# Patient Record
Sex: MALE | URBAN - METROPOLITAN AREA
[De-identification: names, ages, dates, MRNs, and addresses within clinical notes are randomized per-mention and may not be internally consistent; named-entity substitution may affect disease eponyms.]

---

## 2024-09-27 ENCOUNTER — LAB OUTSIDE AN ENCOUNTER (OUTPATIENT)
Dept: URBAN - METROPOLITAN AREA TELEHEALTH 2 | Facility: TELEHEALTH | Age: 39
End: 2024-09-27

## 2024-09-27 ENCOUNTER — DASHBOARD ENCOUNTERS (OUTPATIENT)
Age: 39
End: 2024-09-27

## 2024-09-27 ENCOUNTER — WEB ENCOUNTER (OUTPATIENT)
Dept: URBAN - METROPOLITAN AREA CLINIC 6 | Facility: CLINIC | Age: 39
End: 2024-09-27

## 2024-09-27 ENCOUNTER — OFFICE VISIT (OUTPATIENT)
Dept: URBAN - METROPOLITAN AREA TELEHEALTH 2 | Facility: TELEHEALTH | Age: 39
End: 2024-09-27

## 2024-09-27 VITALS — WEIGHT: 283 LBS | HEIGHT: 69 IN | BODY MASS INDEX: 41.92 KG/M2

## 2024-09-27 NOTE — HPI-TODAY'S VISIT:
Patient is a 40 y/o M who presents with episodic severe nausea and dull RUQ abd pain that radiates to his back x 3 months. Patient reports episodes are increasing in severity and frequency, which has caused him to present to ER/UC ~8 times since July. First UC visit, kidney stones were r/o and he was given a shot of Toradol for back pain. This relieved sxs for 1 month, but now toradol is not helping. Patient is eating smaller portions and staying away from processed/fried foods. This mildly relieved sxs, but episodes are still happening multiple times a week. Denies fevers, chills, jaundice, emesis, unintentional wt loss. No UGI sxs like heartburn or dysphagia. Also taking Zofran prn x 2 weeks which has caused constipation. PALOMA Florentino qod. Normal bowel habits- once a day. Yesterday, noticed some BRBPR on toilet paper after significant straining/constipation. Denies melena, rectal pain. Per patient, [email or web address] [place].  Drinks socially. Denies smoking.

## 2024-09-30 ENCOUNTER — TELEPHONE ENCOUNTER (OUTPATIENT)
Dept: URBAN - METROPOLITAN AREA CLINIC 23 | Facility: CLINIC | Age: 39
End: 2024-09-30

## 2025-01-28 ENCOUNTER — TELEPHONE ENCOUNTER (OUTPATIENT)
Dept: URBAN - METROPOLITAN AREA CLINIC 118 | Facility: CLINIC | Age: 40
End: 2025-01-28

## 2025-03-28 ENCOUNTER — OFFICE VISIT (OUTPATIENT)
Dept: URBAN - METROPOLITAN AREA CLINIC 118 | Facility: CLINIC | Age: 40
End: 2025-03-28